# Patient Record
Sex: MALE | URBAN - METROPOLITAN AREA
[De-identification: names, ages, dates, MRNs, and addresses within clinical notes are randomized per-mention and may not be internally consistent; named-entity substitution may affect disease eponyms.]

---

## 2023-09-07 ENCOUNTER — APPOINTMENT (RX ONLY)
Dept: URBAN - METROPOLITAN AREA CLINIC 86 | Facility: CLINIC | Age: 38
Setting detail: DERMATOLOGY
End: 2023-09-07

## 2023-09-07 DIAGNOSIS — L50.1 IDIOPATHIC URTICARIA: ICD-10-CM

## 2023-09-07 PROCEDURE — 99203 OFFICE O/P NEW LOW 30 MIN: CPT

## 2023-09-07 PROCEDURE — ? TREATMENT REGIMEN

## 2023-09-07 PROCEDURE — ? COUNSELING

## 2023-09-07 ASSESSMENT — LOCATION SIMPLE DESCRIPTION DERM
LOCATION SIMPLE: RIGHT UPPER BACK
LOCATION SIMPLE: ABDOMEN
LOCATION SIMPLE: RIGHT FOREARM
LOCATION SIMPLE: RIGHT THIGH
LOCATION SIMPLE: LEFT ELBOW

## 2023-09-07 ASSESSMENT — LOCATION ZONE DERM
LOCATION ZONE: ARM
LOCATION ZONE: LEG
LOCATION ZONE: TRUNK

## 2023-09-07 ASSESSMENT — LOCATION DETAILED DESCRIPTION DERM
LOCATION DETAILED: EPIGASTRIC SKIN
LOCATION DETAILED: RIGHT INFERIOR UPPER BACK
LOCATION DETAILED: LEFT ANTECUBITAL SKIN
LOCATION DETAILED: RIGHT ANTERIOR PROXIMAL THIGH
LOCATION DETAILED: RIGHT VENTRAL PROXIMAL FOREARM

## 2023-09-07 NOTE — HPI: ITCHING
On A Scale Of 0 To 10 How Would You Rate Your Itching?: 8
How Did Your Itching Occur?: sudden in onset (over a period of weeks to a few months)
How Severe Is Your Itching?: mild
Additional History: Pt reports no rash with the itch.

## 2023-09-07 NOTE — PROCEDURE: TREATMENT REGIMEN
Plan: Recommended to use OTC antihistamines such as 24 hour Claritin, Allegra, Zyrtec, or Xzyal 1-2 tabs po QD-BID, up to 4 tabs daily. Offered patient to a prescription for TAC, but patient declined today.\\nInformed patient that if he is not better will discuss drawing labs for further evaluation of pruritus without a rash.
Detail Level: Simple